# Patient Record
Sex: MALE | Race: BLACK OR AFRICAN AMERICAN | NOT HISPANIC OR LATINO | URBAN - METROPOLITAN AREA
[De-identification: names, ages, dates, MRNs, and addresses within clinical notes are randomized per-mention and may not be internally consistent; named-entity substitution may affect disease eponyms.]

---

## 2018-10-29 ENCOUNTER — EMERGENCY (EMERGENCY)
Facility: HOSPITAL | Age: 58
LOS: 1 days | Discharge: HOME | End: 2018-10-29
Attending: EMERGENCY MEDICINE | Admitting: EMERGENCY MEDICINE
Payer: SELF-PAY

## 2018-10-29 VITALS
HEART RATE: 102 BPM | OXYGEN SATURATION: 100 % | TEMPERATURE: 99 F | DIASTOLIC BLOOD PRESSURE: 78 MMHG | RESPIRATION RATE: 19 BRPM | SYSTOLIC BLOOD PRESSURE: 107 MMHG
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
## 2018-10-29 DIAGNOSIS — S09.90XA UNSPECIFIED INJURY OF HEAD, INITIAL ENCOUNTER: ICD-10-CM
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
## 2018-10-29 PROCEDURE — 99283 EMERGENCY DEPT VISIT LOW MDM: CPT

## 2018-10-29 NOTE — ED PROVIDER NOTE - OBJECTIVE STATEMENT
59 yo male from [facility] with his supervisor for evaluation s/p fall backwards in chair hitting head back of head at 730am.    supervisior betty choi patient for 12 years and states pt is at his baseline

## 2018-10-29 NOTE — ED ADULT NURSE NOTE - CHPI ED NUR SYMPTOMS NEG
no change in level of consciousness/no confusion/no vomiting/no blurred vision/no weakness/no loss of consciousness/no nausea/no dizziness/no seizure/no syncope

## 2018-10-29 NOTE — ED PROVIDER NOTE - NSFOLLOWUPINSTRUCTIONS_ED_ALL_ED_FT
follow up with your doctor as needed  return to the ED if headache nausea vomiting  or for any other concern

## 2018-10-29 NOTE — ED ADULT NURSE NOTE - CHIEF COMPLAINT QUOTE
Pt presents to ED with c/o head injury s/p fall- pt fell backwards out of chair at [facility], denies LOC, + swelling to scalp, not on AC's.

## 2018-10-29 NOTE — ED ADULT TRIAGE NOTE - CHIEF COMPLAINT QUOTE
Pt presents to ED with c/o head injury s/p fall- pt fell backwards out of chair at [facility], denies LOC, + swelling to scalp, not on AC's.

## 2018-10-29 NOTE — ED PROVIDER NOTE - MUSCULOSKELETAL, MLM
No cervical, thoracic ot lumbar spine tenderness to percussion or palpation. Flexion/extension of spine without pain.

## 2018-10-29 NOTE — ED PROVIDER NOTE - NEUROLOGICAL, MLM
Alert and oriented, no focal deficits, no motor or sensory deficits. Alert and oriented, no focal deficits, no motor or sensory deficits. speech fluent and appropriate gait normal

## 2018-10-29 NOTE — ED PROVIDER NOTE - ENMT, MLM
Airway patent, Nasal mucosa clear. Mouth with normal mucosa. Throat has no vesicles, no oropharyngeal exudates and uvula is midline. Airway patent, Nasal mucosa clear. Mouth with normal mucosa. Throat has no vesicles, no oropharyngeal exudates and uvula is midline.    no injury to head / scalp / face

## 2018-10-29 NOTE — ED ADULT NURSE NOTE - NSIMPLEMENTINTERV_GEN_ALL_ED
Implemented All Universal Safety Interventions:  [place] to call system. Call bell, personal items and telephone within reach. Instruct patient to call for assistance. Room bathroom lighting operational. Non-slip footwear when patient is off stretcher. Physically safe environment: no spills, clutter or unnecessary equipment. Stretcher in lowest position, wheels locked, appropriate side rails in place.

## 2019-01-05 ENCOUNTER — INPATIENT (INPATIENT)
Facility: HOSPITAL | Age: 59
LOS: 2 days | Discharge: NURSING, REHAB OR LONG-TERM CARE | End: 2019-01-08
Attending: FAMILY MEDICINE
Payer: OTHER GOVERNMENT

## 2019-01-05 ENCOUNTER — OUTPATIENT (OUTPATIENT)
Dept: OUTPATIENT SERVICES | Facility: HOSPITAL | Age: 59
LOS: 1 days | End: 2019-01-05

## 2019-01-05 PROCEDURE — 71045 X-RAY EXAM CHEST 1 VIEW: CPT | Mod: 26

## 2019-01-05 PROCEDURE — 99285 EMERGENCY DEPT VISIT HI MDM: CPT

## 2019-01-05 PROCEDURE — 70450 CT HEAD/BRAIN W/O DYE: CPT | Mod: 26

## 2019-01-06 ENCOUNTER — OUTPATIENT (OUTPATIENT)
Dept: OUTPATIENT SERVICES | Facility: HOSPITAL | Age: 59
LOS: 1 days | End: 2019-01-06

## 2019-01-07 ENCOUNTER — OUTPATIENT (OUTPATIENT)
Dept: OUTPATIENT SERVICES | Facility: HOSPITAL | Age: 59
LOS: 1 days | End: 2019-01-07

## 2019-01-08 ENCOUNTER — OUTPATIENT (OUTPATIENT)
Dept: OUTPATIENT SERVICES | Facility: HOSPITAL | Age: 59
LOS: 1 days | End: 2019-01-08

## 2019-03-01 ENCOUNTER — EMERGENCY (EMERGENCY)
Facility: HOSPITAL | Age: 59
LOS: 1 days | End: 2019-03-01
Attending: EMERGENCY MEDICINE
Payer: SELF-PAY

## 2019-03-01 VITALS
WEIGHT: 225.09 LBS | DIASTOLIC BLOOD PRESSURE: 105 MMHG | HEART RATE: 76 BPM | SYSTOLIC BLOOD PRESSURE: 179 MMHG | OXYGEN SATURATION: 92 % | RESPIRATION RATE: 18 BRPM | HEIGHT: 72 IN | TEMPERATURE: 97 F

## 2019-03-01 DIAGNOSIS — F33.2 MAJOR DEPRESSIVE DISORDER, RECURRENT SEVERE WITHOUT PSYCHOTIC FEATURES: ICD-10-CM

## 2019-03-01 LAB
ALBUMIN SERPL ELPH-MCNC: 4.7 G/DL — SIGNIFICANT CHANGE UP (ref 3.3–5.2)
ALP SERPL-CCNC: 103 U/L — SIGNIFICANT CHANGE UP (ref 40–120)
ALT FLD-CCNC: 20 U/L — SIGNIFICANT CHANGE UP
AMPHET UR-MCNC: NEGATIVE — SIGNIFICANT CHANGE UP
ANION GAP SERPL CALC-SCNC: 15 MMOL/L — SIGNIFICANT CHANGE UP (ref 5–17)
AST SERPL-CCNC: 32 U/L — SIGNIFICANT CHANGE UP
BARBITURATES UR SCN-MCNC: NEGATIVE — SIGNIFICANT CHANGE UP
BENZODIAZ UR-MCNC: NEGATIVE — SIGNIFICANT CHANGE UP
BILIRUB SERPL-MCNC: 0.8 MG/DL — SIGNIFICANT CHANGE UP (ref 0.4–2)
BUN SERPL-MCNC: 10 MG/DL — SIGNIFICANT CHANGE UP (ref 8–20)
CALCIUM SERPL-MCNC: 9.5 MG/DL — SIGNIFICANT CHANGE UP (ref 8.6–10.2)
CHLORIDE SERPL-SCNC: 100 MMOL/L — SIGNIFICANT CHANGE UP (ref 98–107)
CO2 SERPL-SCNC: 25 MMOL/L — SIGNIFICANT CHANGE UP (ref 22–29)
COCAINE METAB.OTHER UR-MCNC: POSITIVE
CREAT SERPL-MCNC: 0.81 MG/DL — SIGNIFICANT CHANGE UP (ref 0.5–1.3)
ETHANOL SERPL-MCNC: <10 MG/DL — SIGNIFICANT CHANGE UP
GLUCOSE SERPL-MCNC: 105 MG/DL — SIGNIFICANT CHANGE UP (ref 70–115)
HCT VFR BLD CALC: 43.9 % — SIGNIFICANT CHANGE UP (ref 42–52)
HGB BLD-MCNC: 14.8 G/DL — SIGNIFICANT CHANGE UP (ref 14–18)
MCHC RBC-ENTMCNC: 27.2 PG — SIGNIFICANT CHANGE UP (ref 27–31)
MCHC RBC-ENTMCNC: 33.7 G/DL — SIGNIFICANT CHANGE UP (ref 32–36)
MCV RBC AUTO: 80.7 FL — SIGNIFICANT CHANGE UP (ref 80–94)
METHADONE UR-MCNC: NEGATIVE — SIGNIFICANT CHANGE UP
OPIATES UR-MCNC: NEGATIVE — SIGNIFICANT CHANGE UP
PCP SPEC-MCNC: SIGNIFICANT CHANGE UP
PCP UR-MCNC: NEGATIVE — SIGNIFICANT CHANGE UP
PLATELET # BLD AUTO: 215 K/UL — SIGNIFICANT CHANGE UP (ref 150–400)
POTASSIUM SERPL-MCNC: 3.5 MMOL/L — SIGNIFICANT CHANGE UP (ref 3.5–5.3)
POTASSIUM SERPL-SCNC: 3.5 MMOL/L — SIGNIFICANT CHANGE UP (ref 3.5–5.3)
PROT SERPL-MCNC: 8.4 G/DL — SIGNIFICANT CHANGE UP (ref 6.6–8.7)
RBC # BLD: 5.44 M/UL — SIGNIFICANT CHANGE UP (ref 4.6–6.2)
RBC # FLD: 16.3 % — HIGH (ref 11–15.6)
SODIUM SERPL-SCNC: 140 MMOL/L — SIGNIFICANT CHANGE UP (ref 135–145)
THC UR QL: NEGATIVE — SIGNIFICANT CHANGE UP
WBC # BLD: 15.3 K/UL — HIGH (ref 4.8–10.8)
WBC # FLD AUTO: 15.3 K/UL — HIGH (ref 4.8–10.8)

## 2019-03-01 PROCEDURE — 99284 EMERGENCY DEPT VISIT MOD MDM: CPT

## 2019-03-01 PROCEDURE — 93010 ELECTROCARDIOGRAM REPORT: CPT

## 2019-03-01 PROCEDURE — 99285 EMERGENCY DEPT VISIT HI MDM: CPT

## 2019-03-01 RX ORDER — ACETAMINOPHEN 500 MG
650 TABLET ORAL ONCE
Refills: 0 | Status: COMPLETED | OUTPATIENT
Start: 2019-03-01 | End: 2019-03-01

## 2019-03-01 RX ADMIN — Medication 0.2 MILLIGRAM(S): at 21:23

## 2019-03-01 RX ADMIN — Medication 650 MILLIGRAM(S): at 21:22

## 2019-03-01 NOTE — ED ADULT NURSE NOTE - HPI (INCLUDE ILLNESS QUALITY, SEVERITY, DURATION, TIMING, CONTEXT, MODIFYING FACTORS, ASSOCIATED SIGNS AND SYMPTOMS)
[facility] that [facility] and [facility] to go to [facility] for rehab today for "crack cocaine" pt states that he stopped in [place] to see a "girl" and wound up smoking "crack" pt remorse about missing appoinment with rehab and states he feels "suicidal " because of it. pt admits to prior inpatient psych tx's and past rehab assignments, pt also states that he has 1 past suicide attempt by overdosing on "pills" which he is "unsure" what they are? pt denies HI, or AV, contracted with pt to maintain safety while in the  dept. pt states that he had a seizure in January and is unsure of what medication he takes for it.

## 2019-03-01 NOTE — ED BEHAVIORAL HEALTH ASSESSMENT NOTE - SUMMARY
Pt is a 58 yo male who presented to ED with thoughts of suicide by jumping in front of a train.  He reports he resides at the [facility] and today was on his way to do a 21 day rehab program at [facility] when he made a stop in [place] and got high with a girl on crack.  Reports he feels he screwed up again and was so upset he had thoughts of jumping in front of a train to end it all.  Reports long hx of substance abuse, substance treatment and mental health issues. Served in [employer] in [place] for 4 years.  Also reported multiple incarcerations for burglary and substance possession both in  and [facility].  Denies auditory/visual hallucinations, delusions, paranoia, mood lability.  Single, no children.  Currently prescribed Zoloft and Abilify but unknown dosage.  Pt requires psychiatric hospitalization for safety and stabilization.

## 2019-03-01 NOTE — ED ADULT TRIAGE NOTE - CHIEF COMPLAINT QUOTE
pt states he is having thoughts of wanting to hurt himself no set plans starting today. pt states has had them in the past with past attempts. denies HI. denies drinking pt reports smoking street crack.

## 2019-03-01 NOTE — ED BEHAVIORAL HEALTH ASSESSMENT NOTE - DETAILS
Reports thoughts of jumping in front of a train; previous hx of OD [employer] in [facility] for 4 years Upon determination of hospital Admitting MD Dr Horta

## 2019-03-01 NOTE — ED BEHAVIORAL HEALTH ASSESSMENT NOTE - HPI (INCLUDE ILLNESS QUALITY, SEVERITY, DURATION, TIMING, CONTEXT, MODIFYING FACTORS, ASSOCIATED SIGNS AND SYMPTOMS)
Pt is a 58 yo male who presented to ED with thoughts of suicide by jumping in front of a train.  He reports he resides at the [facility] and today was on his way to do a 21 day rehab program at [facility] when he made a stop in [place] and got high with a girl on crack.  Reports he feels he screwed up again and was so upset he had thoughts of jumping in front of a train to end it all.  Reports long hx of substance abuse, substance treatment and mental health issues. Served in [employer] in [employer] for 4 years.  Also reported multiple incarcerations for burglary and substance possession both in  and [facility].  Denies auditory/visual hallucinations, delusions, paranoia, mood lability.  Single, no children.  Currently prescribed Zoloft and Abilify but unknown dosage.  Pt requires psychiatric hospitalization for safety and stabilization.

## 2019-03-01 NOTE — ED ADULT NURSE NOTE - OBJECTIVE STATEMENT
pt received in street clothing and medically cleared by the ed attending, pt presents with a flat affect and stating "I fucked up" pt states that he feels "depressed" and "suicidal" with a plan to "jump in front of a car"

## 2019-03-01 NOTE — ED ADULT NURSE NOTE - NSIMPLEMENTINTERV_GEN_ALL_ED
Implemented All Universal Safety Interventions:  [place] to call system. Call bell, personal items and telephone within reach. Instruct patient to call for assistance. Room bathroom lighting operational. Non-slip footwear when patient is off stretcher. Physically safe environment: no spills, clutter or unnecessary equipment. Stretcher in lowest position, wheels locked, appropriate side rails in place.

## 2019-03-02 ENCOUNTER — INPATIENT (INPATIENT)
Facility: HOSPITAL | Age: 59
LOS: 9 days | Discharge: NURSING, REHAB OR LONG-TERM CARE | End: 2019-03-12
Attending: PSYCHIATRY & NEUROLOGY | Admitting: PSYCHIATRY & NEUROLOGY
Payer: MEDICAID

## 2019-03-02 VITALS
OXYGEN SATURATION: 97 % | SYSTOLIC BLOOD PRESSURE: 115 MMHG | RESPIRATION RATE: 20 BRPM | TEMPERATURE: 99 F | HEART RATE: 63 BPM | DIASTOLIC BLOOD PRESSURE: 73 MMHG

## 2019-03-02 VITALS — SYSTOLIC BLOOD PRESSURE: 91 MMHG | TEMPERATURE: 98 F | DIASTOLIC BLOOD PRESSURE: 57 MMHG | HEART RATE: 64 BPM

## 2019-03-02 DIAGNOSIS — F33.2 MAJOR DEPRESSIVE DISORDER, RECURRENT SEVERE WITHOUT PSYCHOTIC FEATURES: ICD-10-CM

## 2019-03-02 LAB
APPEARANCE UR: CLEAR — SIGNIFICANT CHANGE UP
BILIRUB UR-MCNC: NEGATIVE — SIGNIFICANT CHANGE UP
COLOR SPEC: YELLOW — SIGNIFICANT CHANGE UP
DIFF PNL FLD: ABNORMAL
EPI CELLS # UR: SIGNIFICANT CHANGE UP
GLUCOSE UR QL: NEGATIVE MG/DL — SIGNIFICANT CHANGE UP
KETONES UR-MCNC: ABNORMAL
LEUKOCYTE ESTERASE UR-ACNC: NEGATIVE — SIGNIFICANT CHANGE UP
NITRITE UR-MCNC: NEGATIVE — SIGNIFICANT CHANGE UP
PH UR: 6 — SIGNIFICANT CHANGE UP (ref 5–8)
PROT UR-MCNC: 30 MG/DL
RBC CASTS # UR COMP ASSIST: ABNORMAL /HPF (ref 0–4)
SP GR SPEC: 1.01 — SIGNIFICANT CHANGE UP (ref 1.01–1.02)
UROBILINOGEN FLD QL: NEGATIVE MG/DL — SIGNIFICANT CHANGE UP
WBC UR QL: NEGATIVE — SIGNIFICANT CHANGE UP

## 2019-03-02 PROCEDURE — 80307 DRUG TEST PRSMV CHEM ANLYZR: CPT

## 2019-03-02 PROCEDURE — 80053 COMPREHEN METABOLIC PANEL: CPT

## 2019-03-02 PROCEDURE — 99285 EMERGENCY DEPT VISIT HI MDM: CPT

## 2019-03-02 PROCEDURE — 93005 ELECTROCARDIOGRAM TRACING: CPT

## 2019-03-02 PROCEDURE — 36415 COLL VENOUS BLD VENIPUNCTURE: CPT

## 2019-03-02 PROCEDURE — 81001 URINALYSIS AUTO W/SCOPE: CPT

## 2019-03-02 PROCEDURE — 85027 COMPLETE CBC AUTOMATED: CPT

## 2019-03-02 PROCEDURE — 99211 OFF/OP EST MAY X REQ PHY/QHP: CPT

## 2019-03-02 RX ORDER — TOPIRAMATE 25 MG
100 TABLET ORAL DAILY
Refills: 0 | Status: DISCONTINUED | OUTPATIENT
Start: 2019-03-02 | End: 2019-03-12

## 2019-03-02 RX ORDER — ARIPIPRAZOLE 15 MG/1
2 TABLET ORAL DAILY
Refills: 0 | Status: DISCONTINUED | OUTPATIENT
Start: 2019-03-02 | End: 2019-03-12

## 2019-03-02 RX ORDER — LISINOPRIL 2.5 MG/1
2.5 TABLET ORAL DAILY
Refills: 0 | Status: DISCONTINUED | OUTPATIENT
Start: 2019-03-02 | End: 2019-03-06

## 2019-03-02 RX ORDER — SENNA PLUS 8.6 MG/1
2 TABLET ORAL AT BEDTIME
Refills: 0 | Status: DISCONTINUED | OUTPATIENT
Start: 2019-03-02 | End: 2019-03-12

## 2019-03-02 RX ORDER — SERTRALINE 25 MG/1
50 TABLET, FILM COATED ORAL DAILY
Refills: 0 | Status: DISCONTINUED | OUTPATIENT
Start: 2019-03-02 | End: 2019-03-04

## 2019-03-02 RX ORDER — TAMSULOSIN HYDROCHLORIDE 0.4 MG/1
0.4 CAPSULE ORAL AT BEDTIME
Refills: 0 | Status: DISCONTINUED | OUTPATIENT
Start: 2019-03-02 | End: 2019-03-12

## 2019-03-02 RX ORDER — NALTREXONE HYDROCHLORIDE 50 MG/1
50 TABLET, FILM COATED ORAL DAILY
Refills: 0 | Status: DISCONTINUED | OUTPATIENT
Start: 2019-03-02 | End: 2019-03-12

## 2019-03-02 RX ORDER — LISINOPRIL 2.5 MG/1
2.5 TABLET ORAL DAILY
Refills: 0 | Status: DISCONTINUED | OUTPATIENT
Start: 2019-03-02 | End: 2019-03-12

## 2019-03-02 RX ADMIN — TAMSULOSIN HYDROCHLORIDE 0.4 MILLIGRAM(S): 0.4 CAPSULE ORAL at 21:49

## 2019-03-02 RX ADMIN — LISINOPRIL 2.5 MILLIGRAM(S): 2.5 TABLET ORAL at 10:40

## 2019-03-02 RX ADMIN — SENNA PLUS 2 TABLET(S): 8.6 TABLET ORAL at 21:43

## 2019-03-02 NOTE — ED ADULT NURSE REASSESSMENT NOTE - NS ED NURSE REASSESS COMMENT FT1
Assumed care of patient at 0730.  Patient laying in bed awake.   Patient mood is subdued.  Patient not attempting to harm self or others.  Patient educated about plan of care and pending transfer to inpatient psychiatric unit.  Patient verbalized understanding of plan and agrees with same.  Safety of patient maintained.

## 2019-03-02 NOTE — ED ADULT NURSE REASSESSMENT NOTE - GENERAL PATIENT STATE
resting/sleeping
comfortable appearance/cooperative
comfortable appearance/resting/sleeping

## 2019-03-02 NOTE — ED BEHAVIORAL HEALTH NOTE - BEHAVIORAL HEALTH NOTE
PROGRESS NOTE: 19 @ 10:16  	  • Reason for Ongoing Consultation: 	suicidal ideation     ID: 59yyo Male with HEALTH ISSUES - PROBLEM Dx:  Major depressive disorder, recurrent, severe w/o psychotic behavior      INTERVAL DATA:   • Interval Chief Complaint: "I still feel depressed"  • Interval History: Patient reports he had difficulty sleeping overnight, feels tired this morning. He ate breakfast, though has limited appetite. He shares ongoing depressed mood with suicidal ideation to jump in front of a train. Patient has history of HTN, and is on lisinopril, though cannot recall the dose. [facility], and [facility] from a [facility] pharmacy. Received clonidine 0.2 mg as a one time dose yesterday to address elevated BP.     REVIEW OF SYSTEMS:   • Constitutional Symptoms	Tired  • Eyes	No complaints  • Ears / Nose / Throat / Mouth	No complaints  • Cardiovascular	No complaints  • Respiratory	No complaints  • Gastrointestinal	No complaints  • Genitourinary	No complaints  • Musculoskeletal	No complaints  • Skin	No complaints  • Neurological	No complaints  • Psychiatric (see HPI)	See HPI  • Endocrine	No complaints  • Hematologic / Lymphatic	No complaints  • Allergic / Immunologic	No complaints    REVIEW OF VITALS/LABS/IMAGING/INVESTIGATIONS:   • Vital signs reviewed: Yes  • Vital Signs:	    T(C): 36.9 (19 @ 07:21), Max: 36.9 (19 @ 07:21)  HR: 65 (19 @ 07:21) (65 - 78)  BP: 121/76 (19 @ 07:21) (100/78 - 179/105)  RR: 20 (19 @ 07:21) (17 - 20)  SpO2: 97% (19 @ 07:21) (92% - 97%)    • Available labs reviewed: Yes  • Available Lab Results:                           14.8   15.3  )-----------( 215      ( 01 Mar 2019 21:32 )             43.9     -    140  |  100  |  10.0  ----------------------------<  105  3.5   |  25.0  |  0.81    Ca    9.5      01 Mar 2019 21:32    TPro  8.4  /  Alb  4.7  /  TBili  0.8  /  DBili  x   /  AST  32  /  ALT  20  /  AlkPhos  103  03-    LIVER FUNCTIONS - ( 01 Mar 2019 21:32 )  Alb: 4.7 g/dL / Pro: 8.4 g/dL / ALK PHOS: 103 U/L / ALT: 20 U/L / AST: 32 U/L / GGT: x             Urinalysis Basic - ( 02 Mar 2019 00:33 )    Color: Yellow / Appearance: Clear / S.015 / pH: x  Gluc: x / Ketone: Trace  / Bili: Negative / Urobili: Negative mg/dL   Blood: x / Protein: 30 mg/dL / Nitrite: Negative   Leuk Esterase: Negative / RBC: 3-5 /HPF / WBC Negative   Sq Epi: x / Non Sq Epi: Occasional / Bacteria: x      MEDICATIONS:      PRN Medications: Clonidine 0.2 mg at 9pm last night.   • PRN Medications since last evaluation	  • PRN Details	    Current Medications:   lisinopril 2.5 milliGRAM(s) Oral daily (added by writer)      Medication Side Effects:  • Medication Side Effects or Adverse Reactions (new or ongoing)	None known    MENTAL STATUS EXAM:   • Level of Consciousness	Alert  • General Appearance	Well developed  • Body Habitus	Well nourished  • Hygiene	Good  • Grooming	Good  • Behavior	Cooperative  • Eye Contact	Good  • Relatedness	Good  • Impulse Control	Normal  • Muscle Tone / Strength	Normal muscle tone/strength  • Abnormal Movements	No abnormal movements  • Gait / Station	Normal gait / station  • Speech Volume	Normal  • Speech Rate	Normal  • Speech Spontaneity	Normal  • Speech Articulation	Normal  • Mood	Normal  • Affect Quality	Euthymic  • Affect Range	Full  • Affect Congruence	Congruent  • Thought Process	Linear  • Thought Associations	Normal  • Thought Content	Unremarkable  • Perceptions	No abnormalities  • Oriented to Time	Yes  • Oriented to Place	Yes  • Oriented to Situation	Yes  • Oriented to Person	Yes  • Attention / Concentration	Normal  • Estimated Intelligence	Average  • Recent Memory	Normal  • Remote Memory	Normal  • Fund of Knowledge	Normal  • Language	No abnormalities noted  • Judgment (regarding everyday events)	Fair  • Insight (regarding psychiatric illness)	Fair    SUICIDALITY:   • Suicidality (Interval)	none known    HOMICIDALITY/AGGRESSION:   • Homicidality/Aggression	none known    DIAGNOSIS DSM-V:    Psychiatric Diagnosis (Corresponds to DSM-IV Axis I, II):   HEALTH ISSUES - PROBLEM Dx:  Major depressive disorder, recurrent, severe w/o psychotic behavior     Medical Diagnosis (Corresponds to DSM-IV Axis III):  • Axis III	HTN, BPH     ASSESSMENT OF CURRENT CONDITION:   Summary (include case differential, formulation and patient response to therapy):     Risk Assessment (consider static vs modifiable risk factors and protective factors; comment on level of risk for dangerous behavior):   Elevated acute risk- ongoing suicidal ideation with plan to jump in front of a train- requires hospitalization.     PLAN- transfer to inpatient psychiatry. Bed obtained at [facility]. Patient does not recall dose of lisinopril for HTN, and pharmacy cannot be reached. Patient started on lisinopril 2.5 mg daily, with plan to re-titrate as appropriate. Plan for inpatient team to connect to pt's PMD to clarify.

## 2019-03-02 NOTE — ED BEHAVIORAL HEALTH NOTE - BEHAVIORAL HEALTH NOTE
SW Note: As per psych NP Lilian, pt needs inpt admission on 9.13 legal status. SW spoke to Jeana from [facility] and no beds available, Kelin from [facility] states unsure if bed is available but to fax clinicals, Marion AND from [facility] states bed is available. SW faxed clinicals to [facility] for review. SW to continue to follow for placement. SW Note: As per psych NP Lilian, pt needs inpt admission on 9.13 legal status. SW spoke to Jeana from [facility] and no beds available, [facility] AND from [facility] states bed is available. SW faxed clinicals for review. As per GRETA Pacheco, pt is accepted to [facility] by Dr. Horta. Pt made aware and in agreement with transfer. Ambulance arranged. No need for sw to follow.

## 2019-03-03 PROCEDURE — 99232 SBSQ HOSP IP/OBS MODERATE 35: CPT

## 2019-03-03 RX ORDER — LANOLIN ALCOHOL/MO/W.PET/CERES
3 CREAM (GRAM) TOPICAL ONCE
Refills: 0 | Status: DISCONTINUED | OUTPATIENT
Start: 2019-03-03 | End: 2019-03-04

## 2019-03-03 RX ADMIN — Medication 100 MILLIGRAM(S): at 09:33

## 2019-03-03 RX ADMIN — LISINOPRIL 2.5 MILLIGRAM(S): 2.5 TABLET ORAL at 09:33

## 2019-03-03 RX ADMIN — NALTREXONE HYDROCHLORIDE 50 MILLIGRAM(S): 50 TABLET, FILM COATED ORAL at 09:33

## 2019-03-03 RX ADMIN — TAMSULOSIN HYDROCHLORIDE 0.4 MILLIGRAM(S): 0.4 CAPSULE ORAL at 21:15

## 2019-03-03 RX ADMIN — SENNA PLUS 2 TABLET(S): 8.6 TABLET ORAL at 21:15

## 2019-03-03 RX ADMIN — ARIPIPRAZOLE 2 MILLIGRAM(S): 15 TABLET ORAL at 09:32

## 2019-03-03 RX ADMIN — SERTRALINE 50 MILLIGRAM(S): 25 TABLET, FILM COATED ORAL at 09:33

## 2019-03-03 NOTE — CHART NOTE - NSCHARTNOTEFT_GEN_A_CORE
Screening Medical Evaluation  Patient Admitted from:     [facility] admitting diagnosis:    PAST MEDICAL & SURGICAL HISTORY:  Substance abuse  Depression  No significant past surgical history        Allergies    No Known Allergies    Intolerances        Social History:     FAMILY HISTORY:      MEDICATIONS  (STANDING):  ARIPiprazole 2 milliGRAM(s) Oral daily  lisinopril 2.5 milliGRAM(s) Oral daily  naltrexone 50 milliGRAM(s) Oral daily  senna 2 Tablet(s) Oral at bedtime  sertraline 50 milliGRAM(s) Oral daily  tamsulosin 0.4 milliGRAM(s) Oral at bedtime  topiramate 100 milliGRAM(s) Oral daily    MEDICATIONS  (PRN):  LORazepam     Tablet 2 milliGRAM(s) Oral every 6 hours PRN Agitation  LORazepam   Injectable 2 milliGRAM(s) IntraMuscular once PRN Agitation      Vital Signs Last 24 Hrs  T(C): 36.7 (03 Mar 2019 07:57), Max: 36.7 (03 Mar 2019 07:57)  T(F): 98.1 (03 Mar 2019 07:57), Max: 98.1 (03 Mar 2019 07:57)  HR: 61 (03 Mar 2019 07:57) (61 - 61)  BP: 113/84 (03 Mar 2019 07:57) (113/84 - 113/84)  BP(mean): --  RR: --  SpO2: --  CAPILLARY BLOOD GLUCOSE            PHYSICAL EXAM:  GENERAL: NAD, well-developed  HEAD:  Atraumatic, Normocephalic  EYES: EOMI, PERRLA, conjunctiva and sclera clear  NECK: Supple, No JVD  CHEST/LUNG: Clear to auscultation bilaterally; No wheeze  HEART: Regular rate and rhythm; No murmurs, rubs, or gallops  ABDOMEN: Soft, Nontender, Nondistended; Bowel sounds present  EXTREMITIES:  2+ Peripheral Pulses, No clubbing, cyanosis, or edema  PSYCH: AAOx3  NEUROLOGY: non-focal  SKIN: No rashes or lesions    LABS:                        14.8   15.3  )-----------( 215      ( 01 Mar 2019 21:32 )             43.9         140  |  100  |  10.0  ----------------------------<  105  3.5   |  25.0  |  0.81    Ca    9.5      01 Mar 2019 21:32    TPro  8.4  /  Alb  4.7  /  TBili  0.8  /  DBili  x   /  AST  32  /  ALT  20  /  AlkPhos  103  03-          Urinalysis Basic - ( 02 Mar 2019 00:33 )    Color: Yellow / Appearance: Clear / S.015 / pH: x  Gluc: x / Ketone: Trace  / Bili: Negative / Urobili: Negative mg/dL   Blood: x / Protein: 30 mg/dL / Nitrite: Negative   Leuk Esterase: Negative / RBC: 3-5 /HPF / WBC Negative   Sq Epi: x / Non Sq Epi: Occasional / Bacteria: x        RADIOLOGY & ADDITIONAL TESTS:    Assessment and Plan: Screening Medical Evaluation  Patient Admitted from: [facility] ED    [facility] admitting diagnosis:  Severe episode of recurrent major depressive disorder, without psychotic features    PAST MEDICAL & SURGICAL HISTORY:  HTN  BPH  Substance abuse  Seizure (one instance, 2019)  Depression  Right sided hernia repair- prior to , but does not recall exact year    ALLERGIES  NKDA  No allergies to food     INTOLERANCES    SOCIAL HISTORY:   Admits to EtOH use  Admits to tobacco use- ½ ppd x 40+years  Admits to drug use- Cocaine (last used on 2019)  Single with no children    FAMILY HISTORY:  Does not recall mother or fathers medical history    MEDICATIONS  (STANDING):  ARIPiprazole 2 milliGRAM(s) Oral daily  lisinopril 2.5 milliGRAM(s) Oral daily  naltrexone 50 milliGRAM(s) Oral daily  senna 2 Tablet(s) Oral at bedtime  sertraline 50 milliGRAM(s) Oral daily  tamsulosin 0.4 milliGRAM(s) Oral at bedtime  topiramate 100 milliGRAM(s) Oral daily    MEDICATIONS  (PRN):  LORazepam     Tablet 2 milliGRAM(s) Oral every 6 hours PRN Agitation  LORazepam   Injectable 2 milliGRAM(s) IntraMuscular once PRN Agitation      Vital Signs Last 24 Hrs  T(C): 36.7 (03 Mar 2019 07:57), Max: 36.7 (03 Mar 2019 07:57)  T(F): 98.1 (03 Mar 2019 07:57), Max: 98.1 (03 Mar 2019 07:57)  HR: 61 (03 Mar 2019 07:57) (61 - 61)  BP: 113/84 (03 Mar 2019 07:57) (113/84 - 113/84)  RR:--  SpO2: --      PHYSICAL EXAM:  GENERAL: NAD, well-developed, well-nourished. Cooperative with interview  HEAD:  Atraumatic, Normocephalic  EYES: EOMI, PERRLA  OROPHARYNX: moist mucous membranes, tender to palpation of right upper jaw, tooth #4 loose, no sanguinous discharge or pus.   NECK: Supple  CHEST/LUNG: Clear to auscultation bilaterally; No wheeze, crackles, rhonchi  HEART: Regular rate and rhythm; +s1 and +s2, No murmurs, rubs, or gallops  ABDOMEN: Soft, Nontender, Nondistended; Normoactive Bowel sounds present throughout  EXTREMITIES:  2+ Peripheral Pulses, No clubbing or edema  PSYCH: AAOx3, cooperative, calm and follows instructions appropriately  NEUROLOGY: non-focal  SKIN: No rashes or lesions    LABS:                        14.8   15.3  )-----------( 215      ( 01 Mar 2019 21:32 )             43.9         140  |  100  |  10.0  ----------------------------<  105  3.5   |  25.0  |  0.81    Ca    9.5      01 Mar 2019 21:32    TPro  8.4  /  Alb  4.7  /  TBili  0.8  /  DBili  x   /  AST  32  /  ALT  20  /  AlkPhos  103            Urinalysis Basic - ( 02 Mar 2019 00:33 )    Color: Yellow / Appearance: Clear / S.015 / pH: x  Gluc: x / Ketone: Trace  / Bili: Negative / Urobili: Negative mg/dL   Blood: x / Protein: 30 mg/dL / Nitrite: Negative   Leuk Esterase: Negative / RBC: 3-5 /HPF / WBC Negative   Sq Epi: x / Non Sq Epi: Occasional / Bacteria: x        RADIOLOGY & ADDITIONAL TESTS:    Assessment and Plan:  59 y.o. male who is admitted to [facility] from [facility] for Severe episode of recurrent major depressive disorder, without psychotic features. Pt has PMHx significant for HTN, BPH, substance abuse, and one instance of seizure activity in 2019 where patient was medically evaluated and treated with Topamax. Pt concerned for right upper tooth pain, states it has been loose since he was medically treated for the seizure and would like to have it evaluated. He also admits to non-productive cough worse at nights, during exam no cough was noted. Pt denies any further medical complaints including fevers, chills, visual disturbances, HA, SOB, CP, abdominal pain, N/V, constipation, diarrhea, urinary symptoms (dysuria, hematuria, frequency/urgency to void) or changes in bowel movements. Will continue to monitor for change in symptoms/pain.     1) HTN: continue with home medication Lisinopril 2.5mg daily, and monitor BP  2) BPH: continue with Tamsulosin 0.4mg daily  3) Seizure activity: continue with Topamax 100mg daily  4) Nicotine craving: offered nicotine gum/patch, patient refuses at this time. Make readily available for patient if he changes his mind.   4) Severe episode of recurrent major depressive disorder, without psychotic features: management as per psychiatry team

## 2019-03-04 LAB
CHOLEST SERPL-MCNC: 171 MG/DL — SIGNIFICANT CHANGE UP (ref 120–199)
HBA1C BLD-MCNC: 5.9 % — HIGH (ref 4–5.6)
HDLC SERPL-MCNC: 41 MG/DL — SIGNIFICANT CHANGE UP (ref 35–55)
LIPID PNL WITH DIRECT LDL SERPL: 136 MG/DL — SIGNIFICANT CHANGE UP
TRIGL SERPL-MCNC: 99 MG/DL — SIGNIFICANT CHANGE UP (ref 10–149)
TSH SERPL-MCNC: 0.75 UIU/ML — SIGNIFICANT CHANGE UP (ref 0.27–4.2)

## 2019-03-04 PROCEDURE — 99232 SBSQ HOSP IP/OBS MODERATE 35: CPT

## 2019-03-04 RX ORDER — SERTRALINE 25 MG/1
100 TABLET, FILM COATED ORAL DAILY
Refills: 0 | Status: DISCONTINUED | OUTPATIENT
Start: 2019-03-05 | End: 2019-03-12

## 2019-03-04 RX ORDER — LANOLIN ALCOHOL/MO/W.PET/CERES
6 CREAM (GRAM) TOPICAL ONCE
Refills: 0 | Status: DISCONTINUED | OUTPATIENT
Start: 2019-03-04 | End: 2019-03-12

## 2019-03-04 RX ORDER — TRAZODONE HCL 50 MG
50 TABLET ORAL AT BEDTIME
Refills: 0 | Status: DISCONTINUED | OUTPATIENT
Start: 2019-03-04 | End: 2019-03-12

## 2019-03-04 RX ADMIN — SERTRALINE 50 MILLIGRAM(S): 25 TABLET, FILM COATED ORAL at 10:03

## 2019-03-04 RX ADMIN — ARIPIPRAZOLE 2 MILLIGRAM(S): 15 TABLET ORAL at 10:03

## 2019-03-04 RX ADMIN — LISINOPRIL 2.5 MILLIGRAM(S): 2.5 TABLET ORAL at 10:03

## 2019-03-04 RX ADMIN — Medication 50 MILLIGRAM(S): at 20:12

## 2019-03-04 RX ADMIN — NALTREXONE HYDROCHLORIDE 50 MILLIGRAM(S): 50 TABLET, FILM COATED ORAL at 10:03

## 2019-03-04 RX ADMIN — Medication 100 MILLIGRAM(S): at 10:03

## 2019-03-04 RX ADMIN — SENNA PLUS 2 TABLET(S): 8.6 TABLET ORAL at 20:12

## 2019-03-04 RX ADMIN — TAMSULOSIN HYDROCHLORIDE 0.4 MILLIGRAM(S): 0.4 CAPSULE ORAL at 20:12

## 2019-03-05 PROCEDURE — 90832 PSYTX W PT 30 MINUTES: CPT

## 2019-03-05 PROCEDURE — 99232 SBSQ HOSP IP/OBS MODERATE 35: CPT

## 2019-03-05 RX ADMIN — ARIPIPRAZOLE 2 MILLIGRAM(S): 15 TABLET ORAL at 08:28

## 2019-03-05 RX ADMIN — Medication 50 MILLIGRAM(S): at 21:58

## 2019-03-05 RX ADMIN — SERTRALINE 100 MILLIGRAM(S): 25 TABLET, FILM COATED ORAL at 08:28

## 2019-03-05 RX ADMIN — LISINOPRIL 2.5 MILLIGRAM(S): 2.5 TABLET ORAL at 08:28

## 2019-03-05 RX ADMIN — SENNA PLUS 2 TABLET(S): 8.6 TABLET ORAL at 21:58

## 2019-03-05 RX ADMIN — Medication 100 MILLIGRAM(S): at 08:28

## 2019-03-05 RX ADMIN — NALTREXONE HYDROCHLORIDE 50 MILLIGRAM(S): 50 TABLET, FILM COATED ORAL at 08:28

## 2019-03-05 RX ADMIN — TAMSULOSIN HYDROCHLORIDE 0.4 MILLIGRAM(S): 0.4 CAPSULE ORAL at 21:58

## 2019-03-06 PROCEDURE — 99232 SBSQ HOSP IP/OBS MODERATE 35: CPT

## 2019-03-06 RX ORDER — TUBERCULIN PURIFIED PROTEIN DERIVATIVE 5 [IU]/.1ML
5 INJECTION, SOLUTION INTRADERMAL ONCE
Refills: 0 | Status: COMPLETED | OUTPATIENT
Start: 2019-03-06 | End: 2019-03-06

## 2019-03-06 RX ADMIN — NALTREXONE HYDROCHLORIDE 50 MILLIGRAM(S): 50 TABLET, FILM COATED ORAL at 09:10

## 2019-03-06 RX ADMIN — TAMSULOSIN HYDROCHLORIDE 0.4 MILLIGRAM(S): 0.4 CAPSULE ORAL at 21:00

## 2019-03-06 RX ADMIN — SENNA PLUS 2 TABLET(S): 8.6 TABLET ORAL at 21:00

## 2019-03-06 RX ADMIN — Medication 50 MILLIGRAM(S): at 21:00

## 2019-03-06 RX ADMIN — ARIPIPRAZOLE 2 MILLIGRAM(S): 15 TABLET ORAL at 09:10

## 2019-03-06 RX ADMIN — SERTRALINE 100 MILLIGRAM(S): 25 TABLET, FILM COATED ORAL at 09:11

## 2019-03-06 RX ADMIN — LISINOPRIL 2.5 MILLIGRAM(S): 2.5 TABLET ORAL at 09:10

## 2019-03-06 RX ADMIN — Medication 100 MILLIGRAM(S): at 09:11

## 2019-03-06 NOTE — ED PROVIDER NOTE - AGGRAVATING FACTORS
Height: 62 inches, Weight: 156 pounds (11/15 wt), BMI: 28.5 kg/m2 IBW: 118 pounds (+/-10%), %IBW: 132%. 1+ dependent edema, no pressure injuries noted at present.
none

## 2019-03-06 NOTE — CHART NOTE - NSCHARTNOTEFT_GEN_A_CORE
MINE Note: Completed insurance precert for admission to [facility] on 3/2/19 for inpt psychiatric tx. Spoke with Ins CM Meenu Vargas from [employer] [phone] x [phone]. Auth approved for 6 days 3/2/19-3/7/19. Auth# [id number]. Review due with Meenu PERES on 3/7 19. Info forwarded to [facility] UR dept.

## 2019-03-06 NOTE — ED PROVIDER NOTE - OBJECTIVE STATEMENT
58 yo male pmh of depression 58 yo male pmh of depression, substance abuse comes to ed with suicidal ideation and depressed; pt denies any alcohol or drug use

## 2019-03-07 PROCEDURE — 90853 GROUP PSYCHOTHERAPY: CPT

## 2019-03-07 PROCEDURE — 99232 SBSQ HOSP IP/OBS MODERATE 35: CPT

## 2019-03-07 RX ORDER — IBUPROFEN 200 MG
600 TABLET ORAL ONCE
Refills: 0 | Status: COMPLETED | OUTPATIENT
Start: 2019-03-07 | End: 2019-03-07

## 2019-03-07 RX ADMIN — Medication 100 MILLIGRAM(S): at 09:27

## 2019-03-07 RX ADMIN — Medication 50 MILLIGRAM(S): at 21:01

## 2019-03-07 RX ADMIN — NALTREXONE HYDROCHLORIDE 50 MILLIGRAM(S): 50 TABLET, FILM COATED ORAL at 09:27

## 2019-03-07 RX ADMIN — TAMSULOSIN HYDROCHLORIDE 0.4 MILLIGRAM(S): 0.4 CAPSULE ORAL at 21:00

## 2019-03-07 RX ADMIN — ARIPIPRAZOLE 2 MILLIGRAM(S): 15 TABLET ORAL at 09:27

## 2019-03-07 RX ADMIN — LISINOPRIL 2.5 MILLIGRAM(S): 2.5 TABLET ORAL at 09:27

## 2019-03-07 RX ADMIN — SENNA PLUS 2 TABLET(S): 8.6 TABLET ORAL at 21:00

## 2019-03-07 RX ADMIN — SERTRALINE 100 MILLIGRAM(S): 25 TABLET, FILM COATED ORAL at 09:27

## 2019-03-08 PROCEDURE — 90832 PSYTX W PT 30 MINUTES: CPT

## 2019-03-08 PROCEDURE — 99232 SBSQ HOSP IP/OBS MODERATE 35: CPT

## 2019-03-08 RX ORDER — LISINOPRIL 2.5 MG/1
1 TABLET ORAL
Qty: 0 | Refills: 0 | DISCHARGE
Start: 2019-03-08

## 2019-03-08 RX ORDER — TRAZODONE HCL 50 MG
1 TABLET ORAL
Qty: 30 | Refills: 0
Start: 2019-03-08

## 2019-03-08 RX ORDER — TAMSULOSIN HYDROCHLORIDE 0.4 MG/1
1 CAPSULE ORAL
Qty: 0 | Refills: 0 | DISCHARGE
Start: 2019-03-08

## 2019-03-08 RX ORDER — TRAZODONE HCL 50 MG
1 TABLET ORAL
Qty: 0 | Refills: 0 | DISCHARGE
Start: 2019-03-08

## 2019-03-08 RX ORDER — LISINOPRIL 2.5 MG/1
1 TABLET ORAL
Qty: 30 | Refills: 0
Start: 2019-03-08

## 2019-03-08 RX ORDER — SERTRALINE 25 MG/1
1 TABLET, FILM COATED ORAL
Qty: 30 | Refills: 0
Start: 2019-03-08

## 2019-03-08 RX ORDER — TOPIRAMATE 25 MG
1 TABLET ORAL
Qty: 0 | Refills: 0 | DISCHARGE
Start: 2019-03-08

## 2019-03-08 RX ORDER — SERTRALINE 25 MG/1
1 TABLET, FILM COATED ORAL
Qty: 0 | Refills: 0 | DISCHARGE
Start: 2019-03-08

## 2019-03-08 RX ORDER — NALTREXONE HYDROCHLORIDE 50 MG/1
1 TABLET, FILM COATED ORAL
Qty: 30 | Refills: 0
Start: 2019-03-08

## 2019-03-08 RX ORDER — TOPIRAMATE 25 MG
1 TABLET ORAL
Qty: 30 | Refills: 0
Start: 2019-03-08

## 2019-03-08 RX ORDER — ARIPIPRAZOLE 15 MG/1
1 TABLET ORAL
Qty: 30 | Refills: 0
Start: 2019-03-08

## 2019-03-08 RX ORDER — NALTREXONE HYDROCHLORIDE 50 MG/1
1 TABLET, FILM COATED ORAL
Qty: 0 | Refills: 0 | DISCHARGE
Start: 2019-03-08

## 2019-03-08 RX ORDER — TAMSULOSIN HYDROCHLORIDE 0.4 MG/1
1 CAPSULE ORAL
Qty: 30 | Refills: 0
Start: 2019-03-08

## 2019-03-08 RX ADMIN — NALTREXONE HYDROCHLORIDE 50 MILLIGRAM(S): 50 TABLET, FILM COATED ORAL at 08:50

## 2019-03-08 RX ADMIN — Medication 50 MILLIGRAM(S): at 21:12

## 2019-03-08 RX ADMIN — TAMSULOSIN HYDROCHLORIDE 0.4 MILLIGRAM(S): 0.4 CAPSULE ORAL at 21:12

## 2019-03-08 RX ADMIN — LISINOPRIL 2.5 MILLIGRAM(S): 2.5 TABLET ORAL at 08:50

## 2019-03-08 RX ADMIN — SERTRALINE 100 MILLIGRAM(S): 25 TABLET, FILM COATED ORAL at 08:50

## 2019-03-08 RX ADMIN — Medication 100 MILLIGRAM(S): at 08:50

## 2019-03-08 RX ADMIN — SENNA PLUS 2 TABLET(S): 8.6 TABLET ORAL at 21:12

## 2019-03-08 RX ADMIN — ARIPIPRAZOLE 2 MILLIGRAM(S): 15 TABLET ORAL at 08:50

## 2019-03-09 PROCEDURE — 99232 SBSQ HOSP IP/OBS MODERATE 35: CPT

## 2019-03-09 RX ADMIN — LISINOPRIL 2.5 MILLIGRAM(S): 2.5 TABLET ORAL at 08:41

## 2019-03-09 RX ADMIN — ARIPIPRAZOLE 2 MILLIGRAM(S): 15 TABLET ORAL at 08:41

## 2019-03-09 RX ADMIN — SERTRALINE 100 MILLIGRAM(S): 25 TABLET, FILM COATED ORAL at 08:41

## 2019-03-09 RX ADMIN — NALTREXONE HYDROCHLORIDE 50 MILLIGRAM(S): 50 TABLET, FILM COATED ORAL at 08:41

## 2019-03-09 RX ADMIN — SENNA PLUS 2 TABLET(S): 8.6 TABLET ORAL at 21:13

## 2019-03-09 RX ADMIN — Medication 50 MILLIGRAM(S): at 21:14

## 2019-03-09 RX ADMIN — TAMSULOSIN HYDROCHLORIDE 0.4 MILLIGRAM(S): 0.4 CAPSULE ORAL at 21:14

## 2019-03-09 RX ADMIN — Medication 100 MILLIGRAM(S): at 08:41

## 2019-03-10 PROCEDURE — 99232 SBSQ HOSP IP/OBS MODERATE 35: CPT

## 2019-03-10 RX ADMIN — TAMSULOSIN HYDROCHLORIDE 0.4 MILLIGRAM(S): 0.4 CAPSULE ORAL at 20:48

## 2019-03-10 RX ADMIN — Medication 100 MILLIGRAM(S): at 08:42

## 2019-03-10 RX ADMIN — NALTREXONE HYDROCHLORIDE 50 MILLIGRAM(S): 50 TABLET, FILM COATED ORAL at 08:42

## 2019-03-10 RX ADMIN — Medication 50 MILLIGRAM(S): at 20:48

## 2019-03-10 RX ADMIN — LISINOPRIL 2.5 MILLIGRAM(S): 2.5 TABLET ORAL at 08:42

## 2019-03-10 RX ADMIN — SENNA PLUS 2 TABLET(S): 8.6 TABLET ORAL at 20:48

## 2019-03-10 RX ADMIN — SERTRALINE 100 MILLIGRAM(S): 25 TABLET, FILM COATED ORAL at 08:42

## 2019-03-10 RX ADMIN — ARIPIPRAZOLE 2 MILLIGRAM(S): 15 TABLET ORAL at 08:42

## 2019-03-11 LAB
BASOPHILS # BLD AUTO: 0.04 K/UL — SIGNIFICANT CHANGE UP (ref 0–0.2)
BASOPHILS NFR BLD AUTO: 0.7 % — SIGNIFICANT CHANGE UP (ref 0–2)
EOSINOPHIL # BLD AUTO: 0.19 K/UL — SIGNIFICANT CHANGE UP (ref 0–0.5)
EOSINOPHIL NFR BLD AUTO: 3.2 % — SIGNIFICANT CHANGE UP (ref 0–6)
HCT VFR BLD CALC: 43.9 % — SIGNIFICANT CHANGE UP (ref 39–50)
HGB BLD-MCNC: 13.7 G/DL — SIGNIFICANT CHANGE UP (ref 13–17)
IMM GRANULOCYTES NFR BLD AUTO: 0.7 % — SIGNIFICANT CHANGE UP (ref 0–1.5)
LYMPHOCYTES # BLD AUTO: 2.01 K/UL — SIGNIFICANT CHANGE UP (ref 1–3.3)
LYMPHOCYTES # BLD AUTO: 34.2 % — SIGNIFICANT CHANGE UP (ref 13–44)
MCHC RBC-ENTMCNC: 26.1 PG — LOW (ref 27–34)
MCHC RBC-ENTMCNC: 31.2 % — LOW (ref 32–36)
MCV RBC AUTO: 83.6 FL — SIGNIFICANT CHANGE UP (ref 80–100)
MONOCYTES # BLD AUTO: 0.51 K/UL — SIGNIFICANT CHANGE UP (ref 0–0.9)
MONOCYTES NFR BLD AUTO: 8.7 % — SIGNIFICANT CHANGE UP (ref 2–14)
NEUTROPHILS # BLD AUTO: 3.09 K/UL — SIGNIFICANT CHANGE UP (ref 1.8–7.4)
NEUTROPHILS NFR BLD AUTO: 52.5 % — SIGNIFICANT CHANGE UP (ref 43–77)
NRBC # FLD: 0 K/UL — LOW (ref 25–125)
PLATELET # BLD AUTO: 211 K/UL — SIGNIFICANT CHANGE UP (ref 150–400)
PMV BLD: 11.7 FL — SIGNIFICANT CHANGE UP (ref 7–13)
RBC # BLD: 5.25 M/UL — SIGNIFICANT CHANGE UP (ref 4.2–5.8)
RBC # FLD: 16.8 % — HIGH (ref 10.3–14.5)
WBC # BLD: 5.88 K/UL — SIGNIFICANT CHANGE UP (ref 3.8–10.5)
WBC # FLD AUTO: 5.88 K/UL — SIGNIFICANT CHANGE UP (ref 3.8–10.5)

## 2019-03-11 PROCEDURE — 99231 SBSQ HOSP IP/OBS SF/LOW 25: CPT

## 2019-03-11 RX ADMIN — SENNA PLUS 2 TABLET(S): 8.6 TABLET ORAL at 21:21

## 2019-03-11 RX ADMIN — SERTRALINE 100 MILLIGRAM(S): 25 TABLET, FILM COATED ORAL at 08:49

## 2019-03-11 RX ADMIN — TAMSULOSIN HYDROCHLORIDE 0.4 MILLIGRAM(S): 0.4 CAPSULE ORAL at 21:21

## 2019-03-11 RX ADMIN — ARIPIPRAZOLE 2 MILLIGRAM(S): 15 TABLET ORAL at 08:49

## 2019-03-11 RX ADMIN — Medication 50 MILLIGRAM(S): at 21:21

## 2019-03-11 RX ADMIN — LISINOPRIL 2.5 MILLIGRAM(S): 2.5 TABLET ORAL at 08:49

## 2019-03-11 RX ADMIN — NALTREXONE HYDROCHLORIDE 50 MILLIGRAM(S): 50 TABLET, FILM COATED ORAL at 08:49

## 2019-03-12 VITALS — HEART RATE: 60 BPM | DIASTOLIC BLOOD PRESSURE: 89 MMHG | TEMPERATURE: 98 F | SYSTOLIC BLOOD PRESSURE: 144 MMHG

## 2019-03-12 PROCEDURE — 99238 HOSP IP/OBS DSCHRG MGMT 30/<: CPT

## 2019-03-12 RX ADMIN — ARIPIPRAZOLE 2 MILLIGRAM(S): 15 TABLET ORAL at 09:06

## 2019-03-12 RX ADMIN — SERTRALINE 100 MILLIGRAM(S): 25 TABLET, FILM COATED ORAL at 09:06

## 2019-03-12 RX ADMIN — LISINOPRIL 2.5 MILLIGRAM(S): 2.5 TABLET ORAL at 09:06

## 2019-03-12 RX ADMIN — Medication 100 MILLIGRAM(S): at 09:06

## 2019-03-12 RX ADMIN — NALTREXONE HYDROCHLORIDE 50 MILLIGRAM(S): 50 TABLET, FILM COATED ORAL at 09:06

## 2020-08-06 NOTE — ED PROVIDER NOTE - CPE EDP ENMT NORM
Make your follow-up appointment with your doctor as ordered.   No heavy lifting, driving, or strenuous activity for 6 weeks.  Nothing per vagina such as tampons, intercourse, douches or tub baths for 6 weeks or until you see your doctor.  Call your doctor if you're unable to tolerate food, increase in vaginal bleeding, or have difficulty urinating. Call your doctor if you have pain that is not relieved by your perscribed medications. Notify your doctor with any other concerns.    HTN/PEC?  Prescription handed to you for a blood pressure cuff to monitor your blood pressures at home. Call your doctor if your blood pressure is greater than or equal to 160 systolic (top number) of 110 diastolic (bottom number) or if you experience a headache unrelieved by OTC medications, blurred vision, or difficulty breathing. normal...

## 2021-05-05 NOTE — ED ADULT NURSE NOTE - NS ED BHA MSE SPEECH VOLUME
Using a micropuncture needle the right radial artery was succesfully accessed times 1 in a retrograde fashion over the guidewire using a Sheath Engage 6f 12cm .038.  Soft

## 2022-01-18 NOTE — ED PROVIDER NOTE - NSRISKOFTRANSFER_ED_A_ED
Reason for Disposition  • Mild localized rash    Protocols used: RASH OR REDNESS - [id number]    Patient stated that she was in a hot tub over the weekend and has developed a rash to the left side of her face that is red, bumpy and itchy. Patient has not applied any OTC topical or oral medications to the area.     Writer advised of home care instructions and advised patient to call back if symptoms do not improve or worsen.     Reviewed emergent/urgent symptoms to be see in UC/ED.     Verbalized understanding and questions answered.     
Patient has a rash on her face due to being in hot tub.  Wants to know if she can get a prescription.        Pharmacy:  [facility], [place]  
Patient is returning phone call. Ok to leave a detailed message.   
Writer called, no answer. Voicemail left to return call.     
Transportation Risk (There is always a risk of traffic delays resulting in deterioration of condition.)

## 2022-04-15 NOTE — ED BEHAVIORAL HEALTH ASSESSMENT NOTE - SUICIDE RISK FACTORS
No Highly impulsive behavior/Hopelessness/Substance abuse/dependence/Access to means (pills, firearms, etc.)

## 2022-10-06 NOTE — ED PROVIDER NOTE - ATTESTATION, MLM
n/a
I have reviewed and confirmed nurses' notes for patient's medications, allergies, medical history, and surgical history.

## 2023-02-28 NOTE — ED ADULT NURSE NOTE - NSFALLRSKOUTCOME_ED_ALL_ED
Intubation    Date/Time: 2/28/2023 11:50 AM  Performed by: Chris Cho CRNA  Authorized by: Mono Lara Jr., MD     Intubation:     Intubated:  Postinduction    Mask Ventilation:  N/a (minimal with lma)    Attempts:  1    Attempted By:  CRNA    Method of Intubation:  Video laryngoscopy    Blade:  Choi 3    Laryngeal View Grade: Grade IIA - cords partially seen      Difficult Airway Encountered?: No      Complications:  None (lma x 2 didnt seat)    Airway Device:  Oral endotracheal tube    Airway Device Size:  7.5    Style/Cuff Inflation:  Cuffed    Tube secured:  23    Secured at:  The lips    Placement Verified By:  Capnometry    Complicating Factors:  None    Findings Post-Intubation:  BS equal bilateral and atraumatic/condition of teeth unchanged    
Universal Safety Interventions

## 2024-03-06 RX ORDER — LISINOPRIL 2.5 MG/1
0 TABLET ORAL
Qty: 0 | Refills: 0 | DISCHARGE

## 2024-03-06 RX ORDER — TAMSULOSIN HYDROCHLORIDE 0.4 MG/1
1 CAPSULE ORAL
Qty: 0 | Refills: 0 | DISCHARGE

## 2024-03-06 RX ORDER — TOPIRAMATE 25 MG
1 TABLET ORAL
Qty: 0 | Refills: 0 | DISCHARGE

## 2024-03-06 RX ORDER — SERTRALINE 25 MG/1
0 TABLET, FILM COATED ORAL
Qty: 0 | Refills: 0 | DISCHARGE

## 2024-03-06 RX ORDER — ARIPIPRAZOLE 15 MG/1
0 TABLET ORAL
Qty: 0 | Refills: 0 | DISCHARGE

## 2024-03-06 RX ORDER — SENNA PLUS 8.6 MG/1
1 TABLET ORAL
Qty: 0 | Refills: 0 | DISCHARGE

## 2024-03-06 RX ORDER — NALTREXONE HYDROCHLORIDE 50 MG/1
1 TABLET, FILM COATED ORAL
Qty: 0 | Refills: 0 | DISCHARGE

## 2024-06-07 NOTE — ED ADULT NURSE NOTE - PRO INTERPRETER NEED 2
Assessment/Plan:    TCM/hospital discharge follow up visit   - done 4 days post discharge  - pt is improving  - follow up with CT surgeon as scheduled    Mediastinitis   - s/p CABG possibly triggered by folliculitis  - s/p wound debridement  - improving  - continue with keflex and complete course and follow-up with CT surgeon    CAD  - s/p cabg  - continue with rosuvastatin, aspirin, metoprolol and a heart healthy diet    Acute kidney injury  - creatinine was 1.60 today, up from 1.40  - pt was counseled to drink at least 2 L of water daily and avoid nephrotoxic agents   - we will recheck a BMP in one week     Essential hypertension  - blood pressure is well controlled   -continue with metoprolol  -continue with a low-salt diet and with exercise    Dyslipidemia  -Lipids are improving  -continue with a heart healthy diet  -continue with rosuvastatin       Diagnoses and all orders for this visit:    Hospital discharge follow-up    Mediastinitis    S/P CABG (coronary artery bypass graft)  -     folic acid (FOLVITE) 1 mg tablet; Take 1 tablet (1 mg total) by mouth daily    Prediabetes    Essential hypertension    Coronary artery disease involving coronary bypass graft of native heart without angina pectoris    Acute kidney injury (HCC)  -     Basic metabolic panel; Future  -     Basic metabolic panel    Dyslipidemia  -     folic acid (FOLVITE) 1 mg tablet; Take 1 tablet (1 mg total) by mouth daily             Subjective:      Patient ID: David S Cogan is a 69 y.o. male.    HPI    Patient presents with his wife for a transitional care management visit.  He was admitted at [facility] from 5/28/2024 to 6/20/2024 with sternal osteomyelitis and mediastinitis status post recent CABG with erythema, pain and swelling with purulent drainage from sternotomy incision.  Imaging showed findings concerning for sternal osteomyelitis and mediastinitis.  Patient was taken to the OR for wound debridement and a wound VAC was  placed.  Wound culture grew a few colonies of almost pansensitive Staph aureus, sensitive to cefazolin.  Patient was placed on And discharged to follow-up with his PCP and CT surgery as an outpatient.  Recommendation was for Keflex still 6/17/2024.  Also of note, patient developed acute kidney injury while in the hospital and repeat BMP done today showed persistent acute kidney injury with a creatinine level of 1.60, up from 1.41 on 6/3/2024.  Today, he states that he feels better and pain in his chest is down to 1/10.  He denies any fever, chills, night sweats, headache, dizziness, nasal congestion, runny nose, pnd, sore throat, ear ache, sinus pain or pressure, wheezing, cough,  sob, palpitations, nausea, vomiting, diarrhea, constipation, hematochezia, hematuria, melena stools, arthralgias, myalgias.    He reports that after his CABG sx he was doing well and was cleared by his ct surgeon and then he got a boil in his skin below the drain and it spread with other subsequent boils and he developed another one on his incision and started feeling sick with drainage from his incision.  He states that he is drinking about 6-7 glasses of water daily   Drinks 2 cups of coffee daily and some soda.      TCM Call     Date and time call was made  6/4/2024  9:17 AM    Hospital care reviewed  Records reviewed    Patient was hospitialized at  [facility]    Date of Admission  05/28/24    Date of discharge  06/03/24    Diagnosis  mediastinitis    Disposition  Home    Current Symptoms  None    Dizziness severity  Mild    Quality Character  Lightheadedness    Episode pattern  Intermittent      TCM Call     Post hospital issues  None    Scheduled for follow up?  Yes    Did you obtain your prescribed medications  Yes    Do you need help managing your prescriptions or medications  No    Is transportation to your appointment needed  No    I have advised the patient to call PCP with any new or worsening symptoms  Porter  Mily CHACON          The following portions of the patient's history were reviewed and updated as appropriate: He  has a past medical history of Asthma, Fontanez's esophagus, Colonic polyp, Eczema, GERD (gastroesophageal reflux disease), High cholesterol, Hypertension, and Non-neoplastic nevus.  He   Patient Active Problem List    Diagnosis Date Noted   • Acute kidney injury (HCC) 05/31/2024   • Mediastinitis 05/29/2024   • Coronary artery disease involving coronary bypass graft of native heart without angina pectoris 05/29/2024   • S/P CABG (coronary artery bypass graft) 04/23/2024   • Prediabetes 04/23/2024   • NSTEMI (non-ST elevated myocardial infarction) ([facility]) 04/17/2024   • Asthma 04/17/2024   • Alcohol use, unspecified with other alcohol-induced disorder ([facility]) 09/30/2021   • Essential hypertension 01/07/2020   • Right flank pain 09/19/2019   • Abrasion of flank 09/19/2019   • Overweight (BMI 25.0-29.9) 09/19/2019   • Acquired keratoderma 11/06/2017   • Benign prostatic hyperplasia 11/06/2017   • Dyslipidemia 11/06/2017   • Hyperlipidemia 11/06/2017   • Hypertrophic condition of skin 11/06/2017   • Subclinical hypothyroidism 11/06/2017   • Thyroid nodule 11/06/2017   • Benign hypertension 10/16/2017   • Vesicular palmoplantar eczema 10/16/2017   • Eczema of both hands 10/16/2017     He  has a past surgical history that includes Lymphadenectomy; Colonoscopy; Valve replacement; TONSILLECTOMY; Cardiac catheterization (Left, 4/18/2024); pr coronary artery byp w/vein & artery graft 3 vein (N/A, 4/23/2024); and pr sternal debridement (N/A, 5/30/2024).  His family history includes Cancer in his father; Diabetes in his paternal grandfather.  He  reports that he quit smoking about 36 years ago. His smoking use included cigarettes. He started smoking about 50 years ago. He has a 14 pack-year smoking history. He quit smokeless tobacco use about 39 years ago.  His smokeless tobacco use included chew. He reports that he  does not currently use alcohol. He reports that he does not use drugs.  Current Outpatient Medications   Medication Sig Dispense Refill   • albuterol (PROVENTIL HFA,VENTOLIN HFA) 90 mcg/act inhaler Inhale 90 mcg 2 (two) times a day as needed     • aspirin 325 mg tablet Take 1 tablet (325 mg total) by mouth daily 30 tablet 2   • cephalexin (KEFLEX) 500 mg capsule Take 1 capsule (500 mg total) by mouth every 6 (six) hours for 14 days 56 capsule 0   • cycloSPORINE (RESTASIS) 0.05 % ophthalmic emulsion Apply 0.05 application to eye 2 (two) times a day     • esomeprazole (NexIUM) 40 MG capsule Take 1 capsule (40 mg total) by mouth daily in the early morning 30 capsule 1   • folic acid (FOLVITE) 1 mg tablet Take 1 tablet (1 mg total) by mouth daily 30 tablet 1   • melatonin 3 mg Take 2 tablets (6 mg total) by mouth daily at bedtime 30 tablet 1   • metoprolol tartrate (LOPRESSOR) 25 mg tablet Take 1 tablet (25 mg total) by mouth every 12 (twelve) hours 60 tablet 1   • mometasone (NASONEX) 50 mcg/act nasal spray 2 sprays into each nostril daily     • Multiple Vitamins-Minerals (CENTRUM SILVER 50+MEN) TABS Take 1 tablet by mouth daily     • rosuvastatin (CRESTOR) 40 MG tablet Take 1 tablet (40 mg total) by mouth daily 30 tablet 2   • SYMBICORT 160-4.5 MCG/ACT inhaler Take 2 puffs by mouth 2 (two) times a day     • thiamine 100 MG tablet Take 1 tablet (100 mg total) by mouth daily 30 tablet 1     No current facility-administered medications for this visit.     Current Outpatient Medications on File Prior to Visit   Medication Sig   • albuterol (PROVENTIL HFA,VENTOLIN HFA) 90 mcg/act inhaler Inhale 90 mcg 2 (two) times a day as needed   • aspirin 325 mg tablet Take 1 tablet (325 mg total) by mouth daily   • cephalexin (KEFLEX) 500 mg capsule Take 1 capsule (500 mg total) by mouth every 6 (six) hours for 14 days   • cycloSPORINE (RESTASIS) 0.05 % ophthalmic emulsion Apply 0.05 application to eye 2 (two) times a day   •  esomeprazole (NexIUM) 40 MG capsule Take 1 capsule (40 mg total) by mouth daily in the early morning   • melatonin 3 mg Take 2 tablets (6 mg total) by mouth daily at bedtime   • metoprolol tartrate (LOPRESSOR) 25 mg tablet Take 1 tablet (25 mg total) by mouth every 12 (twelve) hours   • mometasone (NASONEX) 50 mcg/act nasal spray 2 sprays into each nostril daily   • Multiple Vitamins-Minerals (CENTRUM SILVER 50+MEN) TABS Take 1 tablet by mouth daily   • rosuvastatin (CRESTOR) 40 MG tablet Take 1 tablet (40 mg total) by mouth daily   • SYMBICORT 160-4.5 MCG/ACT inhaler Take 2 puffs by mouth 2 (two) times a day   • thiamine 100 MG tablet Take 1 tablet (100 mg total) by mouth daily   • [DISCONTINUED] folic acid (FOLVITE) 1 mg tablet Take 1 tablet (1 mg total) by mouth daily     No current facility-administered medications on file prior to visit.     He is allergic to amoxicillin, amoxicillin-pot clavulanate, and penicillins..    Review of Systems   Constitutional:  Negative for activity change, chills, fatigue, fever and unexpected weight change.   HENT:  Negative for ear pain, postnasal drip, rhinorrhea, sinus pressure and sore throat.    Eyes:  Negative for pain.   Respiratory:  Negative for cough, choking, chest tightness, shortness of breath and wheezing.    Cardiovascular:  Positive for chest pain (msk chest pain is improving). Negative for palpitations and leg swelling.   Gastrointestinal:  Negative for abdominal pain, constipation, diarrhea, nausea and vomiting.   Genitourinary:  Negative for dysuria and hematuria.   Musculoskeletal:  Negative for arthralgias, back pain, gait problem, joint swelling, myalgias and neck stiffness.   Skin:  Positive for wound (chest wall wound is improving). Negative for pallor and rash.   Neurological:  Negative for dizziness, tremors, seizures, syncope, light-headedness and headaches.   Hematological:  Negative for adenopathy.   Psychiatric/Behavioral:  Negative for behavioral  problems.          Objective:      /82 (BP Location: Left arm, Patient Position: Sitting, Cuff Size: Standard)   Pulse 84   Temp 97.6 °F (36.4 °C) (Temporal)   Wt 80.8 kg (178 lb 3.2 oz)   SpO2 96% Comment: room air  BMI 27.10 kg/m²          Physical Exam  Constitutional:       General: He is not in acute distress.     Appearance: He is well-developed. He is not diaphoretic.   HENT:      Head: Normocephalic and atraumatic.      Right Ear: External ear normal.      Left Ear: External ear normal.      Nose: Nose normal.      Mouth/Throat:      Mouth: Mucous membranes are dry.      Pharynx: Posterior oropharyngeal erythema (oropharyngeal erythema with dry mucous membranes) present. No oropharyngeal exudate.   Eyes:      General: No scleral icterus.        Right eye: No discharge.         Left eye: No discharge.      Conjunctiva/sclera: Conjunctivae normal.      Pupils: Pupils are equal, round, and reactive to light.   Neck:      Thyroid: No thyromegaly.      Vascular: No JVD.      Trachea: No tracheal deviation.   Cardiovascular:      Rate and Rhythm: Normal rate and regular rhythm.      Heart sounds: Normal heart sounds. No murmur heard.     No friction rub. No gallop.   Pulmonary:      Effort: Pulmonary effort is normal. No respiratory distress.      Breath sounds: Normal breath sounds. No wheezing or rales.   Chest:      Chest wall: Tenderness (mild tenderness at the incision site on his chest wall with mild redness) present.   Abdominal:      General: Bowel sounds are normal. There is no distension.      Palpations: Abdomen is soft. There is no mass.      Tenderness: There is no abdominal tenderness. There is no guarding or rebound.   Musculoskeletal:         General: No tenderness or deformity. Normal range of motion.      Cervical back: Normal range of motion and neck supple.   Lymphadenopathy:      Cervical: No cervical adenopathy.   Skin:     General: Skin is warm and dry.      Coloration: Skin is  not pale.      Findings: No erythema or rash.   Neurological:      Mental Status: He is alert and oriented to person, place, and time.      Cranial Nerves: No cranial nerve deficit.      Motor: No abnormal muscle tone.      Coordination: Coordination normal.      Deep Tendon Reflexes: Reflexes are normal and symmetric.   Psychiatric:         Behavior: Behavior normal.           Appointment on 06/07/2024   Component Date Value Ref Range Status   • Sodium 06/07/2024 139  135 - 147 mmol/L Final   • Potassium 06/07/2024 4.1  3.5 - 5.3 mmol/L Final   • Chloride 06/07/2024 108  96 - 108 mmol/L Final   • CO2 06/07/2024 24  21 - 32 mmol/L Final   • ANION GAP 06/07/2024 7  4 - 13 mmol/L Final   • BUN 06/07/2024 24  5 - 25 mg/dL Final   • Creatinine 06/07/2024 1.60 (H)  0.60 - 1.30 mg/dL Final    Standardized to IDMS reference method   • Glucose, Fasting 06/07/2024 100 (H)  65 - 99 mg/dL Final   • Calcium 06/07/2024 9.2  8.4 - 10.2 mg/dL Final   • eGFR 06/07/2024 43  ml/min/1.73sq m Final   No results displayed because visit has over 200 results.      No results displayed because visit has over 200 results.      Orders Only on 04/15/2024   Component Date Value Ref Range Status   • Glucose, Random 04/15/2024 93  70 - 99 mg/dL Final   • BUN 04/15/2024 17  8 - 27 mg/dL Final   • Creatinine 04/15/2024 1.50 (H)  0.76 - 1.27 mg/dL Final   • eGFR 04/15/2024 50 (L)  >59 mL/min/1.73 Final   • SL AMB BUN/CREATININE RATIO 04/15/2024 11  10 - 24 Final   • Sodium 04/15/2024 138  134 - 144 mmol/L Final   • Potassium 04/15/2024 5.1  3.5 - 5.2 mmol/L Final   • Chloride 04/15/2024 98  96 - 106 mmol/L Final   • CO2 04/15/2024 23  20 - 29 mmol/L Final   • CALCIUM 04/15/2024 10.0  8.6 - 10.2 mg/dL Final   • Protein, Total 04/15/2024 7.2  6.0 - 8.5 g/dL Final   • Albumin 04/15/2024 4.3  3.9 - 4.9 g/dL Final   • Globulin, Total 04/15/2024 2.9  1.5 - 4.5 g/dL Final   • Albumin/Globulin Ratio 04/15/2024 1.5  1.2 - 2.2 Final   • TOTAL BILIRUBIN  04/15/2024 0.7  0.0 - 1.2 mg/dL Final   • Alk Phos Isoenzymes 04/15/2024 76  44 - 121 IU/L Final   • AST 04/15/2024 39  0 - 40 IU/L Final   • ALT 04/15/2024 23  0 - 44 IU/L Final   • LDL Direct 04/15/2024 134 (H)  0 - 99 mg/dL Final   Office Visit on 12/29/2023   Component Date Value Ref Range Status   • SARS-CoV-2 12/29/2023 Negative  Negative Final   • INFLUENZA A PCR 12/29/2023 Positive (A)  Negative Final   • INFLUENZA B PCR 12/29/2023 Negative  Negative Final   Orders Only on 10/30/2023   Component Date Value Ref Range Status   • White Blood Cell Count 10/30/2023 7.0  3.4 - 10.8 x10E3/uL Final   • Red Blood Cell Count 10/30/2023 5.49  4.14 - 5.80 x10E6/uL Final   • Hemoglobin 10/30/2023 16.9  13.0 - 17.7 g/dL Final   • HCT 10/30/2023 51.1 (H)  37.5 - 51.0 % Final   • MCV 10/30/2023 93  79 - 97 fL Final   • MCH 10/30/2023 30.8  26.6 - 33.0 pg Final   • MCHC 10/30/2023 33.1  31.5 - 35.7 g/dL Final   • RDW 10/30/2023 13.1  11.6 - 15.4 % Final   • Platelet Count 10/30/2023 307  150 - 450 x10E3/uL Final   • Neutrophils 10/30/2023 49  Not Estab. % Final   • Lymphocytes 10/30/2023 37  Not Estab. % Final   • Monocytes 10/30/2023 10  Not Estab. % Final   • Eosinophils 10/30/2023 3  Not Estab. % Final   • Basophils PCT 10/30/2023 1  Not Estab. % Final   • Neutrophils (Absolute) 10/30/2023 3.4  1.4 - 7.0 x10E3/uL Final   • Lymphocytes (Absolute) 10/30/2023 2.6  0.7 - 3.1 x10E3/uL Final   • Monocytes (Absolute) 10/30/2023 0.7  0.1 - 0.9 x10E3/uL Final   • Eosinophils (Absolute) 10/30/2023 0.2  0.0 - 0.4 x10E3/uL Final   • Basophils ABS 10/30/2023 0.1  0.0 - 0.2 x10E3/uL Final   • Immature Granulocytes 10/30/2023 0  Not Estab. % Final   • Immature Granulocytes (Absolute) 10/30/2023 0.0  0.0 - 0.1 x10E3/uL Final    Comment: A hand-written panel/profile was received from your office. In  accordance with the [facility] Ambiguous Test Code Policy dated July 2003, we have assigned CBC with Differential/Platelet, Test Code  #[id number]  to this request. If this is not the testing you wished to  receive on this specimen, please contact the [facility] Client Inquiry/  Technical Services Department to clarify the test order. We  appreciate your business.     • Glucose, Random 10/30/2023 98  70 - 99 mg/dL Final   • BUN 10/30/2023 18  8 - 27 mg/dL Final   • Creatinine 10/30/2023 1.25  0.76 - 1.27 mg/dL Final   • eGFR 10/30/2023 63  >59 mL/min/1.73 Final   • SL AMB BUN/CREATININE RATIO 10/30/2023 14  10 - 24 Final   • Sodium 10/30/2023 138  134 - 144 mmol/L Final   • Potassium 10/30/2023 4.9  3.5 - 5.2 mmol/L Final   • Chloride 10/30/2023 101  96 - 106 mmol/L Final   • CO2 10/30/2023 22  20 - 29 mmol/L Final   • CALCIUM 10/30/2023 9.8  8.6 - 10.2 mg/dL Final   • Protein, Total 10/30/2023 7.0  6.0 - 8.5 g/dL Final   • Albumin 10/30/2023 4.4  3.9 - 4.9 g/dL Final   • Globulin, Total 10/30/2023 2.6  1.5 - 4.5 g/dL Final   • Albumin/Globulin Ratio 10/30/2023 1.7  1.2 - 2.2 Final   • TOTAL BILIRUBIN 10/30/2023 0.5  0.0 - 1.2 mg/dL Final   • Alk Phos Isoenzymes 10/30/2023 79  44 - 121 IU/L Final   • AST 10/30/2023 22  0 - 40 IU/L Final   • ALT 10/30/2023 15  0 - 44 IU/L Final   • Cholesterol, Total 10/30/2023 298 (H)  100 - 199 mg/dL Final   • Triglycerides 10/30/2023 195 (H)  0 - 149 mg/dL Final   • HDL 10/30/2023 69  >39 mg/dL Final   • VLDL Cholesterol Calculated 10/30/2023 36  5 - 40 mg/dL Final   • LDL Calculated 10/30/2023 193 (H)  0 - 99 mg/dL Final   • Comment 10/30/2023 Comment   Final    Comment: Possible Familial Hypercholesterolemia. FH should be suspected when  fasting LDL cholesterol is above 189 mg/dL or non-HDL cholesterol  is above 219 mg/dL. A family history of high cholesterol and heart  disease in 1st degree relatives should be collected. J Clin Lipidol  2011;5:133-140          English

## 2024-12-06 NOTE — ED ADULT TRIAGE NOTE - NS ED NURSE AMBULANCES
Care Management Initial Consult    General Information  Assessment completed with: Patient,    Type of CM/SW Visit: Initial Assessment    Primary Care Provider verified and updated as needed:     Readmission within the last 30 days:        Reason for Consult: discharge planning  Advance Care Planning: Advance Care Planning Reviewed: present on chart          Communication Assessment  Patient's communication style: spoken language (English or Bilingual)    Hearing Difficulty or Deaf: no        Cognitive  Cognitive/Neuro/Behavioral: WDL  Level of Consciousness: alert  Arousal Level: opens eyes spontaneously  Orientation: oriented x 4  Mood/Behavior: calm, cooperative  Best Language: 0 - No aphasia  Speech: clear    Living Environment:   People in home: facility resident     Current living Arrangements: residential facility  Name of Facility: [facility]   Able to return to prior arrangements: yes  Living Arrangement Comments: Bed hold    Family/Social Support:  Care provided by: other (see comments) (facility staff)  Provides care for: no one  Marital Status:   Support system:            Description of Support System:           Current Resources:   Patient receiving home care services: No        Community Resources: Dialysis Services, DME  Equipment currently used at home:    Supplies currently used at home:      Employment/Financial:  Employment Status: disabled        Financial Concerns:             Does the patient's insurance plan have a 3 day qualifying hospital stay waiver?  No    Lifestyle & Psychosocial Needs:  Social Drivers of Health     Food Insecurity: Low Risk  (12/5/2024)    Food Insecurity     Within the past 12 months, did you worry that your food would run out before you got money to buy more?: No     Within the past 12 months, did the food you bought just not last and you didn t have money to get more?: No   Depression: Not at risk (8/12/2024)    PHQ-2     PHQ-2 Score: 0   Housing  Stability: Low Risk  (12/5/2024)    Housing Stability     Do you have housing? : Yes     Are you worried about losing your housing?: No   Tobacco Use: Medium Risk (11/27/2024)    Patient History     Smoking Tobacco Use: Former     Smokeless Tobacco Use: Never     Passive Exposure: Not on file   Financial Resource Strain: Low Risk  (12/5/2024)    Financial Resource Strain     Within the past 12 months, have you or your family members you live with been unable to get utilities (heat, electricity) when it was really needed?: No   Alcohol Use: Unknown (11/28/2018)    [facility] and [facility], [facility]    AUDIT-C     Frequency of Alcohol Consumption: Monthly or less     Average Number of Drinks: Not on file     Frequency of Binge Drinking: Never   Transportation Needs: Low Risk  (12/5/2024)    Transportation Needs     Within the past 12 months, has lack of transportation kept you from medical appointments, getting your medicines, non-medical meetings or appointments, work, or from getting things that you need?: No   Physical Activity: Unknown (10/8/2024)    Exercise Vital Sign     Days of Exercise per Week: 0 days     Minutes of Exercise per Session: Not on file   Interpersonal Safety: Low Risk  (11/28/2024)    Interpersonal Safety     Do you feel physically and emotionally safe where you currently live?: Yes     Within the past 12 months, have you been hit, slapped, kicked or otherwise physically hurt by someone?: No     Within the past 12 months, have you been humiliated or emotionally abused in other ways by your partner or ex-partner?: No   Stress: No Stress Concern Present (10/8/2024)    [employer] - Occupational Stress Questionnaire     Feeling of Stress : Only a little   Social Connections: Unknown (10/8/2024)    Social Connection and Isolation Panel [NHANES]     Frequency of Communication with Friends and  Family: Not on file     Frequency of Social Gatherings with Friends and Family: Patient declined     Attends [employer] Services: Not on file     Active Member of Clubs or Organizations: Not on file     Attends Club or Organization Meetings: Not on file     Marital Status: Not on file   Health Literacy: Not on file       Functional Status:  Prior to admission patient needed assistance:   Dependent ADLs:: Ambulation-cane  Dependent IADLs:: Independent       Mental Health Status:          Chemical Dependency Status:                Values/Beliefs:  Spiritual, Cultural Beliefs, [employer] Practices, Values that affect care:                 Discussed  Partnership in Safe Discharge Planning  document with patient/family: No    Additional Information:  Patient was admitted on 12/5/24 after he was seen outpatient by Cardiology this AM and noted SBPs in the 70s and symptomatic, directed to the ED where he received fluids and one dose of midodrine with improvement, per H&P. Patient has been at [facility] for continued PT/OT. Pt has a bed hold and can return when ready. On the weekend, the phone number to reach the TCU is [phone]. Spoke with pt, who confirmed the above. Pt will need [facility] wheelchair transport at discharge. Informed by hospitalist that pt is medically ready for discharge. Unfortunately, transport is booked too late for the facility to take back today, and pt states no family/friends are available to transport.     Scheduled transport for Saturday, 12/7 between [id number]. Updated patient. Sent message to [facility] admissions.    KATELYN Price  Social Work  [facility]        [facility]